# Patient Record
Sex: FEMALE | Race: WHITE | HISPANIC OR LATINO | URBAN - METROPOLITAN AREA
[De-identification: names, ages, dates, MRNs, and addresses within clinical notes are randomized per-mention and may not be internally consistent; named-entity substitution may affect disease eponyms.]

---

## 2020-12-17 ENCOUNTER — OFFICE VISIT (OUTPATIENT)
Dept: URBAN - METROPOLITAN AREA CLINIC 19 | Facility: CLINIC | Age: 35
End: 2020-12-17
Payer: COMMERCIAL

## 2020-12-17 ENCOUNTER — DASHBOARD ENCOUNTERS (OUTPATIENT)
Age: 35
End: 2020-12-17

## 2020-12-17 DIAGNOSIS — R12 HEARTBURN: ICD-10-CM

## 2020-12-17 DIAGNOSIS — K62.5 RECTAL BLEEDING: ICD-10-CM

## 2020-12-17 DIAGNOSIS — Z80.0 FAMILY HISTORY OF COLON CANCER: ICD-10-CM

## 2020-12-17 PROCEDURE — G8482 FLU IMMUNIZE ORDER/ADMIN: HCPCS | Performed by: INTERNAL MEDICINE

## 2020-12-17 PROCEDURE — 99244 OFF/OP CNSLTJ NEW/EST MOD 40: CPT | Performed by: INTERNAL MEDICINE

## 2020-12-17 RX ORDER — NORETHINDRONE ACETATE AND ETHINYL ESTRADIOL 1.5-30(21)
1 TABLET KIT ORAL ONCE A DAY
Status: ACTIVE | COMMUNITY

## 2020-12-17 RX ORDER — PANTOPRAZOLE SODIUM 40 MG/1
1 TABLET TABLET, DELAYED RELEASE ORAL BID
Qty: 180 TABLET | Refills: 2 | OUTPATIENT
Start: 2020-12-17

## 2020-12-17 RX ORDER — HYDROCORTISONE ACETATE 25 MG/1
1 SUPPOSITORY SUPPOSITORY RECTAL TWICE A DAY
Qty: 14 | Refills: 0 | OUTPATIENT
Start: 2020-12-17 | End: 2020-12-24

## 2020-12-17 RX ORDER — SUCRALFATE 1 G
1 TABLET ON AN EMPTY STOMACH TABLET ORAL TWICE A DAY
Qty: 180 TABLET | Refills: 2 | OUTPATIENT
Start: 2020-12-17 | End: 2021-09-13

## 2020-12-17 RX ORDER — PANTOPRAZOLE SODIUM 40 MG/1
1 TABLET TABLET, DELAYED RELEASE ORAL ONCE A DAY
Status: ACTIVE | COMMUNITY

## 2020-12-17 NOTE — HPI-OTHER HISTORIES
Mrs. Flores is a 35 year old female who was referred by Dr. Clarke Akers for heartburn and rectal bleeding. A copy of this note will be sent to her physician.   Mrs. Flores reports since her myomectomy for uterine fibroids at [facility] she has been having heartburn. She reports having reflux every day with every meal. She reports she was taking pepcid BID and 2 months ago starting protonix 40mg. She reports despite this still having heartburn daily.   She reports having an EGD in 2006 and reports it showed 2 ulcers.   She reports having rectal bleeding for approximately 2 months. She reports having rectal bleeding 2-3 times a week. She reports no known hemorrhoids. She reports having occassional mild constipation.   She reports her maternal grandfather was diagnosed with colon cancer in 80s. She reports her maternal aunt was diagnosed with colon cancer in her 30s.

## 2021-01-08 ENCOUNTER — OFFICE VISIT (OUTPATIENT)
Dept: URBAN - METROPOLITAN AREA MEDICAL CENTER 25 | Facility: MEDICAL CENTER | Age: 36
End: 2021-01-08

## 2023-04-13 ENCOUNTER — OFFICE VISIT (OUTPATIENT)
Dept: URBAN - METROPOLITAN AREA CLINIC 19 | Facility: CLINIC | Age: 38
End: 2023-04-13

## 2024-03-13 NOTE — PHYSICAL EXAM CHEST:
[facility]  Progress Note    CHIEF COMPLAINT:    Chief Complaint   Patient presents with    Follow - Up     INJ: 11/16/2023 pt comes in to f/u on Left greater trochanter bursae corticosteroid injection. Admits 80% improvement for 2 weeks. Presents with no pain today after ER visit on 3/7/2024. She went to ER for L buttock and lateral hip stabbing/burning pain. Also mentions that she had swelling in that area. She was given Toradol injection and completed Medrol dose pack. Denies T/N. Admits weakness.       History of Present Illness:  The patient is a 57 year old right-handed female with significant past medical history of hyperlipidemia who presents for follow-up of her low back pain with radiation down left leg.  She was last seen by me on 11/16/2023 were performed a left greater trochanter bursa injection which provided 2 weeks of relief.  Today she rates her pain a 0 out of 10 although she was seen in the emergency department on 3/7/2024 where she was given a Toradol injection and started on oral steroids.  She does feel that that has been tremendously helpful.  I have reviewed those notes.    PAST MEDICAL HISTORY:  Past Medical History:   Diagnosis Date    Anesthesia complication     Biliary calculus     Difficult intubation     Difficulty opening mouth wide during general anesthesia    Esophageal reflux     Hyperlipidemia     Lipid screening 05/17/2014    per NG    Other ill-defined conditions(799.89)     per [facility]:  \"Abnormal pap smear; Management:  colposcopy x3\"    Type 2 diabetes mellitus without retinopathy (HCC) 01/25/2021    Vitamin D deficiency        SURGICAL HISTORY:  Past Surgical History:   Procedure Laterality Date    CHOLECYSTECTOMY  2014    COLONOSCOPY  2014    COLONOSCOPY N/A 06/12/2018    few diverticula    EGD  06/2018    small HH, H Pylori gastritis    EGD  2014    ERCP DUCT STENT PLACEMENT  03/2014    bile leak    HYSTERECTOMY  1992     breathing is unlabored without accessory muscle use partial hystero.    OTHER SURGICAL HISTORY      Bladder surgery       SOCIAL HISTORY:   Social History     Occupational History    Not on file   Tobacco Use    Smoking status: Former     Packs/day: 0.00     Years: 18.00     Additional pack years: 0.00     Total pack years: 0.00     Types: Cigarettes     Start date: 1999     Quit date: 2012     Years since quittin.6    Smokeless tobacco: Never   Vaping Use    Vaping Use: Never used   Substance and Sexual Activity    Alcohol use: Not Currently     Comment: socially    Drug use: No    Sexual activity: Yes       FAMILY HISTORY:   Family History   Problem Relation Age of Onset    Cancer Mother         Uterus Cancer; age 40 cause of death    Diabetes Father     Cancer Father         Lung CA    Breast Cancer Sister 49    Glaucoma Neg     Macular degeneration Neg        CURRENT MEDICATIONS:   Current Outpatient Medications   Medication Sig Dispense Refill    naproxen 500 MG Oral Tab Take 1 tablet (500 mg total) by mouth 2 (two) times daily as needed. 20 tablet 0    semaglutide (OZEMPIC, 0.25 OR 0.5 MG/DOSE,) 2 MG/3ML Subcutaneous Solution Pen-injector Begin 0.25mg into skin weekly x 4 weeks, increase to 0.5mg into skin weekly 9 mL 0    MOUNJARO 5 MG/0.5ML Subcutaneous Solution Pen-injector ADMINISTER 5 MG UNDER THE SKIN 1 TIME A WEEK 2 mL 0    PHENTERMINE HCL 37.5 MG Oral Tab TAKE 1 TABLET(37.5 MG) BY MOUTH BEFORE BREAKFAST 30 tablet 0    cyclobenzaprine 10 MG Oral Tab Take 1 tablet (10 mg total) by mouth nightly as needed. 30 tablet 0    TRI-MALIKA 0.01-4-0.05 % External Cream Apply 1 Application topically 2 (two) times daily.      atorvastatin 10 MG Oral Tab Take 1 tablet (10 mg total) by mouth nightly.      pantoprazole 40 MG Oral Tab EC Take 1 tablet (40 mg total) by mouth before breakfast. 90 tablet 3    Empagliflozin (JARDIANCE) 25 MG Oral Tab Take 25 mg by mouth daily. 90 tablet 4    fluticasone propionate 50 MCG/ACT Nasal Suspension 2 sprays by  Nasal route daily. 48 g 2    Cholecalciferol (VITAMIN D3) 1.25 MG (53490 UT) Oral Cap Take 1 capsule by mouth once a week. 12 capsule 4    levocetirizine 5 MG Oral Tab Take 1 tablet (5 mg total) by mouth every evening. 90 tablet 1    Estradiol (ESTRACE) 0.1 MG/GM Vaginal Cream Apply 1/2 gram vaginally 2 times per week. 1 Tube 3       ALLERGIES:   Allergies   Allergen Reactions    Clindamycin RASH       REVIEW OF SYSTEMS:   Review of Systems   Constitutional: Negative.    HENT: Negative.    Eyes: Negative.    Respiratory: Negative.    Cardiovascular: Negative.    Gastrointestinal: Negative.    Genitourinary: Negative.    Musculoskeletal: As per HPI  Skin: Negative.    Neurological: As per HPI  Endo/Heme/Allergies: Negative.    Psychiatric/Behavioral: Negative.      All other systems reviewed and are negative. Pertinent positives and negatives noted in the HPI.        PHYSICAL EXAM:   There were no vitals taken for this visit.    There is no height or weight on file to calculate BMI.      General: No immediate distress  Head: Normocephalic/ Atraumatic  Eyes: Extra-occular movements intact.   Ears: No auricular hematoma or deformities  Mouth: No lesions or ulcerations  Heart: peripheral pulses intact. Normal capillary refill.   Lungs: Non-labored respirations  Abdomen: No abdominal guarding  Extremities: No lower extremity edema bilaterally   Skin: No lesions noted.   Cognition: alert & oriented x 3, attentive, able to follow 2 step commands, comprehention intact, spontaneous speech intact  Motor:    Musculoskeletal:        Data  [facility] Lab Encounter on 12/11/2023   Component Date Value Ref Range Status    Occult Blood Result 12/11/2023 Negative for Occult Blood  Negative for Occult Blood Final    C. Difficile Toxin B Gene 12/11/2023 Negative  Negative Final    Giardia Antigen Stool 12/11/2023 Negative  Negative Final    Cryptosporidium Antigen 12/11/2023 Negative  Negative Final    Stool Culture 12/11/2023 No Salmonella,  Shigella, Campylobacter, Yersinia, Edwardsiella, Aeromonas or Plesiomonas isolated   Final    E Coli Shigatoxin 12/11/2023 Negative for Shigatoxins  Negative Final   Admission on 12/10/2023, Discharged on 12/10/2023   Component Date Value Ref Range Status    Urine Color 12/10/2023 Yellow  Yellow Final    Urine Clarity 12/10/2023 Clear  Clear Final    Specific Gravity, Urine 12/10/2023 >=1.030  1.005 - 1.030 Final    PH, Urine 12/10/2023 5.5  5.0 - 8.0 Final    Protein urine 12/10/2023 30 (A)  Negative mg/dL Final    Glucose, Urine 12/10/2023 100 (A)  Negative mg/dL Final    Ketone, Urine 12/10/2023 Negative  Negative mg/dL Final    Bilirubin, Urine 12/10/2023 Negative  Negative Final    Blood, Urine 12/10/2023 Negative  Negative Final    Nitrite Urine 12/10/2023 Negative  Negative Final    Urobilinogen urine 12/10/2023 <2.0  <2.0 mg/dL Final    Leukocyte esterase urine 12/10/2023 Negative  Negative Final    WBC IC 12/10/2023 7.0  4.0 - 11.0 x10ˆ3/uL Final    RBC IC 12/10/2023 5.90 (H)  3.80 - 5.30 X10ˆ6/uL Final    HGB IC 12/10/2023 16.2 (H)  12.0 - 16.0 g/dL Final    HCT IC 12/10/2023 50.3 (H)  35.0 - 48.0 % Final    MCV IC 12/10/2023 85.3  80.0 - 100.0 fL Final    MCH IC 12/10/2023 27.5  26.0 - 34.0 pg Final    MCHC IC 12/10/2023 32.2  31.0 - 37.0 g/dL Final    PLT IC 12/10/2023 341.0  150.0 - 450.0 X10ˆ3/uL Final    # Neutrophil 12/10/2023 4.0  1.5 - 7.7 X10ˆ3/uL Final    # Lymphocyte 12/10/2023 2.5  1.0 - 4.0 X10ˆ3/uL Final    # Mixed Cells 12/10/2023 0.5  0.1 - 1.0 X10ˆ3/uL Final    Neutrophil % 12/10/2023 56.3  % Final    Lymphocyte % 12/10/2023 36.0  % Final    Mixed Cell % 12/10/2023 7.7  % Final    ISTAT Sodium 12/10/2023 141  136 - 145 mmol/L Final    ISTAT BUN 12/10/2023 14  7 - 18 mg/dL Final    ISTAT Potassium 12/10/2023 3.5 (L)  3.6 - 5.1 mmol/L Final    ISTAT Chloride 12/10/2023 103  98 - 112 mmol/L Final    ISTAT Ionized Calcium 12/10/2023 1.17  1.12 - 1.32 mmol/L Final    ISTAT Hematocrit 12/10/2023  51 (H)  34 - 50 % Final    ISTAT Glucose 12/10/2023 86  70 - 99 mg/dL Final    ISTAT TCO2 12/10/2023 28  21 - 32 mmol/L Final    ISTAT Creatinine 12/10/2023 0.70  0.55 - 1.02 mg/dL Final    eGFR-Cr 12/10/2023 101  >=60 mL/min/1.73m2 Final   Office Visit on 09/18/2023   Component Date Value Ref Range Status    Malb/Cre Calc 06/02/2023 17.0  ug/mg Final    TSH 06/02/2023 1.040  mIU/mL Final    WBC 06/02/2023 6.8  x10(3)uL Final    HGB 06/02/2023 16.4   Final    HCT 06/02/2023 49.6   Final    MCV 06/02/2023 83   Final    PLT 06/02/2023 333   Final    HgbA1C 06/02/2023 6.1  % Final    Cholesterol, Total 06/02/2023 214  mg/dL Final    HDL Cholesterol 06/02/2023 66  mg/dL Final    Direct LDL 06/02/2023 124  mg/dL Final    Triglycerides 06/02/2023 140  mg/dL Final    VLDL 06/02/2023 24  mg/dL Final    Non HDL Chol 06/02/2023 148  mg/dL Final    CREATININE 06/02/2023 0.71  mg/dL Final    Creatinine, Urine 06/02/2023 75.4  mg/dL Final    Glucose 06/02/2023 86  mg/dL Final    BUN 06/02/2023 15  mg/dL Final    Bilirubin, Total 06/02/2023 0.5  mg/dL Final    AST 06/02/2023 16  U/L Final    ALT 06/02/2023 16  U/L Final    CALCIUM 06/02/2023 9.4   Final    eGFR non- 06/02/2023 100   Final    TP 06/02/2023 7.2   Final    Microalbumin, Urine, Calc 06/02/2023 13  mg/24hr Final    Urine Color 06/02/2023 Yellow   Final    Urine Clarity 06/02/2023 Clear   Final    Specific Gravity 06/02/2023 1.030   Final    Glucose Urine 06/02/2023 300   Final    Bilirubin Urine 06/02/2023 Negative   Final    Ketones Urine 06/02/2023 Negative   Final    Blood Urine 06/02/2023 Negative   Final    pH Urine 06/02/2023 6.0   Final    Protein Urine 06/02/2023 Negative   Final    Urobilinogen Urine 06/02/2023 0.2   Final    Nitrite Urine 06/02/2023 Negative   Final    Leukocyte Esterase Urine 06/02/2023 Negative   Final    WBC Urine 06/02/2023 0-5   Final    RBC Urine 06/02/2023 None Seen   Final   ]      Radiology Imaging:  I reviewed with  the patient her MRI of the lumbar spine from 7/12/2023  PROCEDURE:  MRI SPINE LUMBAR (CPT=72148)     COMPARISON:  None.     INDICATIONS:  M48.061 Lumbar foraminal stenosis M51.36 Bulge of lumbar disc without myelopathy M47.816 Lumbar spondylosis M54.59 Mechanical low back pain M47.816 Facet syndr*     TECHNIQUE:  Multiplanar T1 and T2 weighted images including fat suppression sequences.  Images acquired in sagittal and axial planes.       PATIENT STATED HISTORY: (As transcribed by Technologist)  Patient states bilateral leg discomfort with lower back pain.      FINDINGS:  Alignment of the lumbar spine is normal.  Vertebral body heights are maintained throughout the lumbar spine.  Mild loss of L4-L5 disc space with mild endplate osteoarthritic changes.  Marrow signal is unremarkable.  The conus is at L1-L2.  The visualized portion of the spinal cord is of normal caliber without focal signal abnormality.  T12-L1: No disc herniation, spinal canal or neuroforaminal stenosis.  L1-L2: No disc herniation or spinal canal or neuroforaminal stenosis.  L2-L3: No disc herniation, spinal canal or neuroforaminal stenosis.  L3-L4:  Minimal disc bulge without spinal canal or neural foraminal stenosis.  L4-L5:  Minimal disc bulge without spinal canal stenosis.  No significant neural foraminal stenosis.  L5-S1:  Central disc herniation superimposed on a broad-based disc bulge is noted.  Facet hypertrophic changes are noted.  Severe left and moderate right neural foraminal stenosis.                   Impression   CONCLUSION:       1. No significant spinal canal stenosis.     2. Disc bulges at L4-5 and L5-S1 are noted.  Severe left and moderate right neural foraminal stenosis at L5-S1.          LOCATION:  [facility]        Dictated by (CST): Bismark Cuello MD on 7/12/2023 at 3:13 PM      Finalized by (CST): Bismark Cuello MD on 7/12/2023 at 3:15 PM         ASSESSMENT AND PLAN:  Deborah is a pleasant 57-year-old female presents  for follow-up of her left-sided low back and buttock pain with radiation down the left leg.  She has completed a full course of oral steroids as well as a Toradol injection from the emergency department on March 7, 2024.  I have reviewed those notes.  At this time, she does not have any pain or radicular symptoms but if her symptoms do return, I would recommend a left L5 and left S1 transforaminal epidural steroid injection.  I have recommended she start formal physical therapy and have given her an order for this.  She can also use Naprosyn and Flexeril as needed.  She will follow-up with me if her symptoms return and we can perform the injection.       RTC in as needed  Discharge Instructions were provided as documented in AVS summary.  The patient was in agreement with the assessment and plan.  All questions were answered.  There were no barriers to learning.         1. Lumbar radiculopathy    2. DDD (degenerative disc disease), lumbosacral    3. Lumbar foraminal stenosis    4. Bulge of lumbar disc without myelopathy    5. Lumbar spondylosis    6. Mechanical low back pain    7. Facet syndrome, lumbar    8. Myalgia    9. Biomechanical lesion    10. Scapular dyskinesis    11. Class 1 obesity due to excess calories with serious comorbidity and body mass index (BMI) of 30.0 to 30.9 in adult        Alex B. Behar MD  Physical Medicine and Rehabilitation/Sports Medicine  [facility]